# Patient Record
Sex: MALE | Race: WHITE | Employment: FULL TIME | URBAN - METROPOLITAN AREA
[De-identification: names, ages, dates, MRNs, and addresses within clinical notes are randomized per-mention and may not be internally consistent; named-entity substitution may affect disease eponyms.]

---

## 2018-10-13 ENCOUNTER — HOSPITAL ENCOUNTER (EMERGENCY)
Age: 23
Discharge: HOME | End: 2018-10-13
Payer: COMMERCIAL

## 2018-10-13 VITALS
DIASTOLIC BLOOD PRESSURE: 92 MMHG | HEART RATE: 63 BPM | BODY MASS INDEX: 32.72 KG/M2 | WEIGHT: 233.69 LBS | TEMPERATURE: 98 F | SYSTOLIC BLOOD PRESSURE: 166 MMHG | OXYGEN SATURATION: 97 % | RESPIRATION RATE: 16 BRPM | HEIGHT: 71 IN

## 2018-10-13 DIAGNOSIS — H18.891 CORNEAL RUST RING OF RIGHT EYE: ICD-10-CM

## 2018-10-13 DIAGNOSIS — S05.01XA ABRASION OF RIGHT CORNEA, INITIAL ENCOUNTER: ICD-10-CM

## 2018-10-13 DIAGNOSIS — T15.01XA FOREIGN BODY OF RIGHT CORNEA, INITIAL ENCOUNTER: Primary | ICD-10-CM

## 2018-10-13 PROCEDURE — 4500000023 HC ED LEVEL 3 PROCEDURE

## 2018-10-13 PROCEDURE — 99283 EMERGENCY DEPT VISIT LOW MDM: CPT

## 2018-10-13 PROCEDURE — 6370000000 HC RX 637 (ALT 250 FOR IP): Performed by: PHYSICIAN ASSISTANT

## 2018-10-13 RX ORDER — TETRACAINE HYDROCHLORIDE 5 MG/ML
2 SOLUTION OPHTHALMIC ONCE
Status: COMPLETED | OUTPATIENT
Start: 2018-10-13 | End: 2018-10-13

## 2018-10-13 RX ORDER — ERYTHROMYCIN 5 MG/G
OINTMENT OPHTHALMIC
Qty: 1 TUBE | Refills: 0 | Status: SHIPPED | OUTPATIENT
Start: 2018-10-13

## 2018-10-13 RX ADMIN — TETRACAINE HYDROCHLORIDE 2 DROP: 5 SOLUTION OPHTHALMIC at 20:11

## 2018-10-13 ASSESSMENT — ENCOUNTER SYMPTOMS
EYE PAIN: 1
PHOTOPHOBIA: 1
VOMITING: 0
BACK PAIN: 0
EYE DISCHARGE: 1
NAUSEA: 0
SHORTNESS OF BREATH: 0
ABDOMINAL PAIN: 0
SORE THROAT: 0

## 2018-10-13 ASSESSMENT — VISUAL ACUITY
OD: 20/20
OU: 20/15
OS: 20/15

## 2018-10-13 NOTE — ED TRIAGE NOTES
Pt states that he was working under a trailer and something dropped in his eye pt states that he tried to wash it out using water and eyedrops but was unsuccessful.  Pt is A and O x 4 and answering questions approprietly and without difficulty

## 2022-09-21 ENCOUNTER — APPOINTMENT (OUTPATIENT)
Dept: GENERAL RADIOLOGY | Age: 27
End: 2022-09-21
Payer: COMMERCIAL

## 2022-09-21 ENCOUNTER — HOSPITAL ENCOUNTER (EMERGENCY)
Age: 27
Discharge: HOME | End: 2022-09-21
Attending: EMERGENCY MEDICINE
Payer: COMMERCIAL

## 2022-09-21 VITALS
TEMPERATURE: 98.2 F | WEIGHT: 263.01 LBS | BODY MASS INDEX: 36.82 KG/M2 | HEART RATE: 107 BPM | OXYGEN SATURATION: 97 % | RESPIRATION RATE: 17 BRPM | SYSTOLIC BLOOD PRESSURE: 121 MMHG | DIASTOLIC BLOOD PRESSURE: 83 MMHG | HEIGHT: 71 IN

## 2022-09-21 DIAGNOSIS — U07.1 COVID-19: ICD-10-CM

## 2022-09-21 DIAGNOSIS — S29.012A MUSCLE STRAIN OF LEFT UPPER BACK, INITIAL ENCOUNTER: Primary | ICD-10-CM

## 2022-09-21 DIAGNOSIS — F41.1 ANXIETY STATE: ICD-10-CM

## 2022-09-21 LAB
A/G RATIO: 1.9 (ref 1.1–2.2)
ALBUMIN SERPL-MCNC: 4.8 G/DL (ref 3.4–5)
ALP BLD-CCNC: 115 U/L (ref 40–129)
ALT SERPL-CCNC: 33 U/L (ref 10–40)
ANION GAP SERPL CALCULATED.3IONS-SCNC: 12 MMOL/L (ref 3–16)
AST SERPL-CCNC: 22 U/L (ref 15–37)
BASOPHILS ABSOLUTE: 0.1 K/UL (ref 0–0.2)
BASOPHILS RELATIVE PERCENT: 1 %
BILIRUB SERPL-MCNC: <0.2 MG/DL (ref 0–1)
BILIRUBIN URINE: NEGATIVE
BLOOD, URINE: NEGATIVE
BUN BLDV-MCNC: 5 MG/DL (ref 7–20)
CALCIUM SERPL-MCNC: 9.2 MG/DL (ref 8.3–10.6)
CHLORIDE BLD-SCNC: 100 MMOL/L (ref 99–110)
CLARITY: CLEAR
CO2: 24 MMOL/L (ref 21–32)
COLOR: ABNORMAL
CREAT SERPL-MCNC: 0.9 MG/DL (ref 0.9–1.3)
D DIMER: <0.27 UG/ML FEU (ref 0–0.6)
EOSINOPHILS ABSOLUTE: 0.2 K/UL (ref 0–0.6)
EOSINOPHILS RELATIVE PERCENT: 1.9 %
GFR AFRICAN AMERICAN: >60
GFR NON-AFRICAN AMERICAN: >60
GLUCOSE BLD-MCNC: 107 MG/DL (ref 70–99)
GLUCOSE URINE: NEGATIVE MG/DL
HCT VFR BLD CALC: 40.5 % (ref 40.5–52.5)
HEMOGLOBIN: 14.1 G/DL (ref 13.5–17.5)
KETONES, URINE: ABNORMAL MG/DL
LACTIC ACID, SEPSIS: 0.8 MMOL/L (ref 0.4–1.9)
LACTIC ACID, SEPSIS: 2 MMOL/L (ref 0.4–1.9)
LEUKOCYTE ESTERASE, URINE: NEGATIVE
LYMPHOCYTES ABSOLUTE: 0.3 K/UL (ref 1–5.1)
LYMPHOCYTES RELATIVE PERCENT: 2.5 %
MAGNESIUM: 1.7 MG/DL (ref 1.8–2.4)
MCH RBC QN AUTO: 29.2 PG (ref 26–34)
MCHC RBC AUTO-ENTMCNC: 34.7 G/DL (ref 31–36)
MCV RBC AUTO: 84.1 FL (ref 80–100)
MICROSCOPIC EXAMINATION: ABNORMAL
MONOCYTES ABSOLUTE: 1.5 K/UL (ref 0–1.3)
MONOCYTES RELATIVE PERCENT: 15.3 %
NEUTROPHILS ABSOLUTE: 8.1 K/UL (ref 1.7–7.7)
NEUTROPHILS RELATIVE PERCENT: 79.3 %
NITRITE, URINE: NEGATIVE
PDW BLD-RTO: 13.5 % (ref 12.4–15.4)
PH UA: 5.5 (ref 5–8)
PLATELET # BLD: 231 K/UL (ref 135–450)
PMV BLD AUTO: 9.7 FL (ref 5–10.5)
POTASSIUM REFLEX MAGNESIUM: 3.5 MMOL/L (ref 3.5–5.1)
PROTEIN UA: NEGATIVE MG/DL
RAPID INFLUENZA  B AGN: NEGATIVE
RAPID INFLUENZA A AGN: NEGATIVE
RBC # BLD: 4.82 M/UL (ref 4.2–5.9)
SARS-COV-2, NAAT: DETECTED
SEDIMENTATION RATE, ERYTHROCYTE: 14 MM/HR (ref 0–15)
SODIUM BLD-SCNC: 136 MMOL/L (ref 136–145)
SPECIFIC GRAVITY UA: 1.02 (ref 1–1.03)
TOTAL PROTEIN: 7.3 G/DL (ref 6.4–8.2)
URINE REFLEX TO CULTURE: ABNORMAL
URINE TYPE: ABNORMAL
UROBILINOGEN, URINE: 1 E.U./DL
WBC # BLD: 10.2 K/UL (ref 4–11)

## 2022-09-21 PROCEDURE — 85379 FIBRIN DEGRADATION QUANT: CPT

## 2022-09-21 PROCEDURE — 96376 TX/PRO/DX INJ SAME DRUG ADON: CPT

## 2022-09-21 PROCEDURE — 85652 RBC SED RATE AUTOMATED: CPT

## 2022-09-21 PROCEDURE — 83735 ASSAY OF MAGNESIUM: CPT

## 2022-09-21 PROCEDURE — 96361 HYDRATE IV INFUSION ADD-ON: CPT

## 2022-09-21 PROCEDURE — 87635 SARS-COV-2 COVID-19 AMP PRB: CPT

## 2022-09-21 PROCEDURE — 71046 X-RAY EXAM CHEST 2 VIEWS: CPT

## 2022-09-21 PROCEDURE — 6370000000 HC RX 637 (ALT 250 FOR IP): Performed by: PHYSICIAN ASSISTANT

## 2022-09-21 PROCEDURE — 80053 COMPREHEN METABOLIC PANEL: CPT

## 2022-09-21 PROCEDURE — 99284 EMERGENCY DEPT VISIT MOD MDM: CPT

## 2022-09-21 PROCEDURE — 81003 URINALYSIS AUTO W/O SCOPE: CPT

## 2022-09-21 PROCEDURE — 2580000003 HC RX 258: Performed by: PHYSICIAN ASSISTANT

## 2022-09-21 PROCEDURE — 96375 TX/PRO/DX INJ NEW DRUG ADDON: CPT

## 2022-09-21 PROCEDURE — 87804 INFLUENZA ASSAY W/OPTIC: CPT

## 2022-09-21 PROCEDURE — 6360000002 HC RX W HCPCS: Performed by: PHYSICIAN ASSISTANT

## 2022-09-21 PROCEDURE — 96374 THER/PROPH/DIAG INJ IV PUSH: CPT

## 2022-09-21 PROCEDURE — 6360000002 HC RX W HCPCS: Performed by: EMERGENCY MEDICINE

## 2022-09-21 PROCEDURE — 87040 BLOOD CULTURE FOR BACTERIA: CPT

## 2022-09-21 PROCEDURE — 85025 COMPLETE CBC W/AUTO DIFF WBC: CPT

## 2022-09-21 PROCEDURE — 36415 COLL VENOUS BLD VENIPUNCTURE: CPT

## 2022-09-21 PROCEDURE — 83605 ASSAY OF LACTIC ACID: CPT

## 2022-09-21 RX ORDER — ACETAMINOPHEN 500 MG
1000 TABLET ORAL ONCE
Status: COMPLETED | OUTPATIENT
Start: 2022-09-21 | End: 2022-09-21

## 2022-09-21 RX ORDER — 0.9 % SODIUM CHLORIDE 0.9 %
30 INTRAVENOUS SOLUTION INTRAVENOUS ONCE
Status: COMPLETED | OUTPATIENT
Start: 2022-09-21 | End: 2022-09-21

## 2022-09-21 RX ORDER — ORPHENADRINE CITRATE 30 MG/ML
60 INJECTION INTRAMUSCULAR; INTRAVENOUS ONCE
Status: COMPLETED | OUTPATIENT
Start: 2022-09-21 | End: 2022-09-21

## 2022-09-21 RX ORDER — KETOROLAC TROMETHAMINE 30 MG/ML
15 INJECTION, SOLUTION INTRAMUSCULAR; INTRAVENOUS ONCE
Status: COMPLETED | OUTPATIENT
Start: 2022-09-21 | End: 2022-09-21

## 2022-09-21 RX ORDER — ACETAMINOPHEN 500 MG
500 TABLET ORAL 4 TIMES DAILY PRN
Qty: 40 TABLET | Refills: 0 | Status: SHIPPED | OUTPATIENT
Start: 2022-09-21

## 2022-09-21 RX ORDER — KETOROLAC TROMETHAMINE 10 MG/1
10 TABLET, FILM COATED ORAL 3 TIMES DAILY
Qty: 15 TABLET | Refills: 0 | Status: SHIPPED | OUTPATIENT
Start: 2022-09-21 | End: 2022-09-26

## 2022-09-21 RX ADMIN — KETOROLAC TROMETHAMINE 15 MG: 30 INJECTION, SOLUTION INTRAMUSCULAR at 08:54

## 2022-09-21 RX ADMIN — SODIUM CHLORIDE 2259 ML: 9 INJECTION, SOLUTION INTRAVENOUS at 09:15

## 2022-09-21 RX ADMIN — KETOROLAC TROMETHAMINE 15 MG: 30 INJECTION, SOLUTION INTRAMUSCULAR at 12:39

## 2022-09-21 RX ADMIN — ORPHENADRINE CITRATE 60 MG: 30 INJECTION INTRAMUSCULAR; INTRAVENOUS at 12:39

## 2022-09-21 RX ADMIN — ACETAMINOPHEN 1000 MG: 500 TABLET ORAL at 10:56

## 2022-09-21 ASSESSMENT — ENCOUNTER SYMPTOMS
SORE THROAT: 0
NAUSEA: 0
VOMITING: 0
SHORTNESS OF BREATH: 0
ABDOMINAL PAIN: 0
BACK PAIN: 1

## 2022-09-21 ASSESSMENT — PAIN SCALES - GENERAL
PAINLEVEL_OUTOF10: 6
PAINLEVEL_OUTOF10: 6
PAINLEVEL_OUTOF10: 8
PAINLEVEL_OUTOF10: 6
PAINLEVEL_OUTOF10: 6

## 2022-09-21 ASSESSMENT — PAIN DESCRIPTION - DESCRIPTORS: DESCRIPTORS: PATIENT UNABLE TO DESCRIBE

## 2022-09-21 ASSESSMENT — PAIN DESCRIPTION - LOCATION: LOCATION: SHOULDER

## 2022-09-21 ASSESSMENT — PAIN DESCRIPTION - ORIENTATION: ORIENTATION: LEFT

## 2022-09-21 NOTE — ED NOTES

## 2022-09-21 NOTE — ED PROVIDER NOTES
I independently performed a history and physical on [address]. All diagnostic, treatment, and disposition decisions were made by myself in conjunction with the advanced practice provider. For further details of [facility] emergency department encounter, please see Divine Christiansen PA-C's documentation. Patient complains of a left-sided neck and upper back pain that came on Monday while lifting a heavy object. He states he went to urgent care and was given some muscle relaxers which have given some slight improvement. He still has an 8 out of 10 pain that is aching and sharp. He also arrives with a fever. He denies any other complaints. He was not even aware he had a fever before he got here. No vomiting or diarrhea. No chest pain or shortness of breath. No rhinorrhea or cough or sore throat. Patient reports that he does have severe anxiety around hospitals and doctors and does not actually want to be here. On exam heart tachycardic and regular and lungs clear to auscultation bilaterally and abdomen benign. Left side of the neck is tender posteriorly in the paraspinous muscles and the trapezius.     Labs Reviewed   COVID-19, RAPID - Abnormal; Notable for the following components:       Result Value    SARS-CoV-2, NAAT DETECTED (*)     All other components within normal limits   CBC WITH AUTO DIFFERENTIAL - Abnormal; Notable for the following components:    Neutrophils Absolute 8.1 (*)     Lymphocytes Absolute 0.3 (*)     Monocytes Absolute 1.5 (*)     All other components within normal limits   COMPREHENSIVE METABOLIC PANEL W/ REFLEX TO MG FOR LOW K - Abnormal; Notable for the following components:    Glucose 107 (*)     BUN 5 (*)     All other components within normal limits   URINALYSIS WITH REFLEX TO CULTURE - Abnormal; Notable for the following components:    Color, UA DARK YELLOW (*)     Ketones, Urine TRACE (*)     All other components within normal limits   LACTATE, SEPSIS - Abnormal; Notable for the following components:    Lactic Acid, Sepsis 2.0 (*)     All other components within normal limits   MAGNESIUM - Abnormal; Notable for the following components:    Magnesium 1.70 (*)     All other components within normal limits   RAPID INFLUENZA A/B ANTIGENS   CULTURE, BLOOD 1   CULTURE, BLOOD 2   LACTATE, SEPSIS   SEDIMENTATION RATE   D-DIMER, QUANTITATIVE     XR CHEST (2 VW)   Final Result   Negative           I personally saw this patient and performed a substantive portion of the visit including all aspects of the medical decision making. MDM  Aside from having COVID the patient has no other PE risk factors and a negative D-dimer. I feel that his tachycardia is not due to any COVID complication or PE but rather due to his significant anxiety. I advised him to stay well-hydrated and return for any chest pain or shortness of breath    I personally saw this patient and independently provided 10 minutes of non-concurrent critical care out of the total shared critical care time provided.         Marylou Trevino MD  09/21/22 2893

## 2022-09-21 NOTE — ED NOTES
Discharge and education instructions reviewed. Patient verbalized understanding, teach-back successful. Patient denied questions at this time. No acute distress noted. Patient instructed to follow-up as noted - return to emergency department if symptoms worsen. Patient verbalized understanding. Discharged per EDMD with discharge instructions.           Jeff Broussard RN  09/21/22 2730

## 2022-09-21 NOTE — DISCHARGE INSTR - COC
Continuity of Care Form    Patient Name: Bindu Carrillo   :  1995  MRN:  [id number]    Admit date:  2022  Discharge date:  ***    Code Status Order: No Order   Advance Directives:     Admitting Physician:  No admitting provider for patient encounter. PCP: Lashawn Silverman MD    Discharging Nurse: [facility] Unit/Room#: 038/D-38  Discharging Unit Phone Number: ***    Emergency Contact:   Extended Emergency Contact Information  Primary Emergency Contact: Cheikh Corea  Address: [address] [facility], [address] [place] [address] Phone: [phone]  Work Phone: [phone]  Relation: Parent  Secondary Emergency Contact: Jess Pina  Address: [address] [place] [address], [address] [place] [address],[address] Phone: [phone]  Work Phone: [phone]  Relation: Spouse    Past Surgical History:  History reviewed. No pertinent surgical history. Immunization History: There is no immunization history for the selected administration types on file for this patient. Active Problems: There is no problem list on file for this patient.       Isolation/Infection:   Isolation            No Isolation          Patient Infection Status       Infection Onset Added Last Indicated Last Indicated By Review Planned Expiration Resolved Resolved By    COVID-19 (Rule Out) 22 COVID-19, Rapid (Ordered) 10/01/22 10/05/22      Resolved    COVID-19 (Rule Out) 22 COVID-19 & Influenza Combo (Ordered)   22 Rule-Out Test Canceled            Nurse Assessment:  Last Vital Signs: BP (!) 142/83   Pulse (!) 130   Temp (!) 102.3 °F (39.1 °C) (Oral)   Resp 24   Ht 5' 11\" (1.803 m)   Wt 263 lb 0.1 oz (119.3 kg)   SpO2 100%   BMI 36.68 kg/m²     Last documented pain score (0-10 scale): Pain Level: 8  Last Weight:   Wt Readings from Last 1 Encounters:   22 263 lb 0.1 oz (119.3 kg)     Mental Status: {IP PT MENTAL STATUS:}    IV Access:  { BENJY IV ACCESS:226252745}    Nursing Mobility/ADLs:  Walking   {P DME ELZA}  Transfer  {P DME OREY:967321233}  Bathing  {CHP DME TMEI:050078553}  Dressing  {CHP DME MZVR:827435924}  Toileting  {CHP DME DWYK:866934595}  Feeding  {[facility] DME AJAF:465400674}  Med Admin  {P DME WJYA:371936766}  Med Delivery   { BENJY MED Delivery:568110106}    Wound Care Documentation and Therapy:        Elimination:  Continence: Bowel: {YES / NL:89759}  Bladder: {YES / F}  Urinary Catheter: {Urinary Catheter:551621657}   Colostomy/Ileostomy/Ileal Conduit: {YES / FN:97363}       Date of Last BM: ***  No intake or output data in the 24 hours ending 22 0914  No intake/output data recorded.     Safety Concerns:     [employer] Safety Concerns:916549324}    Impairments/Disabilities:      [employer] Impairments/Disabilities:044157923}    Nutrition Therapy:  Current Nutrition Therapy:   [employer] Diet List:809707779}    Routes of Feeding: {[facility] DME Other Feedings:514388010}  Liquids: {Slp liquid thickness:96456}  Daily Fluid Restriction: {P DME Yes amt example:382991368}  Last Modified Barium Swallow with Video (Video Swallowing Test): {Done Not Done MXCD:954142857}    Treatments at the Time of Hospital Discharge:   Respiratory Treatments: ***  Oxygen Therapy:  {Therapy; copd oxygen:94276}  Ventilator:    {[facility] Vent SDTO:859205932}    Rehab Therapies: {THERAPEUTIC INTERVENTION:7996716645}  Weight Bearing Status/Restrictions: [employer] Weight Bearin}  Other Medical Equipment (for information only, NOT a DME order):  {EQUIPMENT:349954850}  Other Treatments: ***    Patient's personal belongings (please select all that are sent with patient):  {[facility] DME Belongings:504611880}    RN SIGNATURE:  {Esignature:106447784}    CASE MANAGEMENT/SOCIAL WORK SECTION    Inpatient Status Date: ***    Readmission Risk Assessment Score:  Readmission Risk              Risk of Unplanned Readmission:  0           Discharging to Facility/ Agency   Name:   Address:  Phone:  Fax:    Dialysis Facility (if applicable)   Name:  Address:  Dialysis Schedule:  Phone:  Fax:    / signature: {Esignature:341613707}    PHYSICIAN SECTION    Prognosis: {Prognosis:4196782297}    Condition at Discharge: Satya8 Ciara Louie Patient Condition:095959868}    Rehab Potential (if transferring to Rehab): {Prognosis:7075194573}    Recommended Labs or Other Treatments After Discharge: ***    Physician Certification: I certify the above information and transfer of Mayda Arriaga  is necessary for the continuing treatment of the diagnosis listed and that he requires {Admit to Appropriate Level of Care:59953} for {GREATER/LESS:860783748} 30 days.      Update Admission H&P: {CHP DME Changes in AIXWO:876652846}    PHYSICIAN SIGNATURE:  {Esignature:547775057}

## 2022-09-21 NOTE — ED NOTES
Notified by lab that they are running QC on machines and lactic result will be delayed.       Martie Kawasaki, RN  09/21/22 0583

## 2022-09-21 NOTE — Clinical Note
May Kamara was seen and treated in our emergency department on 9/21/2022. COVID19 virus is suspected. Per the CDC guidelines we recommend home isolation until the following conditions are all met:    1. At least five days have passed since symptoms first appeared and/or had a close exposure,   2. After home isolation for five days, wearing a mask around others for the next five days,  3. At least 24 have passed since last fever without the use of fever-reducing medications and  4. Symptoms (eg cough, shortness of breath) have improved    If you have any questions or concerns, please don't hesitate to call. He may return to work/school on 09/26/2022    May return once fever free for 24 hours after 9/25 and mask for the first 5 days.      Javan Sheets MD

## 2022-09-21 NOTE — ED PROVIDER NOTES
[address] [facility]      Pt Name: Mindy Mack  MRN: [id number]  [place] 1995  Date of evaluation: 9/21/2022  Provider: Paola Chawla PA-C    This patient was seen and evaluated by the attending physician Bernarda Daugherty MD.    CHIEF COMPLAINT      Chief Complaint: back pain; fever      CRITICAL CARE TIME   Total Critical Care time was 35 minutes, excluding separately reportable procedures. There was a high probability of clinically significant/life threatening deterioration in the patient's condition which required my urgent intervention. HISTORYOF PRESENT ILLNESS  (Location/Symptom, Timing/Onset, Context/Setting, Quality, Duration, Modifying Factors, Severity.)   Mindy Mack is a 32 y.o. male who presents to the emergency department complaining of upper back pain. Pain started while working on Monday. He says he went to lift something off of a cart that was about waist high when he developed pain in his left upper back. It has been constant since onset. It worsens with movements. He went to urgent care and was prescribed Flexeril which she has been taking with some improvement. He rates the pain 8 out of 10 describes as aching and sharp. He reports no associated loss of control of bowel or bladder, numbness or weakness of extremities, saddle anesthesia. No history of IV drug abuse. The patient denied known fever however was found to be febrile with a temp of 102.3 on arrival.  He says he did not realize he had a fever and he denies any other symptoms such as cough, sore throat, headache, body aches, chest pain or shortness of breath. Nursing Notes were reviewed and I agree. REVIEW OF SYSTEMS    (2-9 systems for level 4, 10 or more forlevel 5)     Review of Systems   Constitutional:  Negative for chills and fever. HENT:  Negative for sore throat. Respiratory:  Negative for shortness of breath. Cardiovascular:  Negative for chest pain. Gastrointestinal:  Negative for abdominal pain, nausea and vomiting. Genitourinary:  Negative for difficulty urinating and dysuria. Musculoskeletal:  Positive for back pain. Skin:  Negative for rash. Neurological:  Negative for weakness, light-headedness, numbness and headaches. Psychiatric/Behavioral:  The patient is not nervous/anxious. All other systems reviewed and are negative. Positives and Pertinent negatives as per HPI. Except as noted above the remainder of the review of systems was reviewed and negative. PAST MEDICALHISTORY   History reviewed. No pertinent past medical history. SURGICAL HISTORY     History reviewed. No pertinent surgical history. CURRENT MEDICATIONS       Previous Medications    ERYTHROMYCIN (ROMYCIN) 5 MG/GM OPHTHALMIC OINTMENT    1/2 inch to the conjunctival sac 4 times daily for 5 days       ALLERGIES     Patient has no known allergies. FAMILY HISTORY     History reviewed. No pertinent family history. No family status information on file. SOCIAL HISTORY    reports that he has quit smoking. His smoking use included cigarettes. He smoked an average of .5 packs per day. He has never used smokeless tobacco. He reports that he does not drink alcohol and does not use drugs. PHYSICAL EXAM    (up to 7 for level 4, 8 or more for level 5)     ED Triage Vitals   BP Temp Temp Source Heart Rate Resp SpO2 Height Weight   09/21/22 0826 09/21/22 0826 09/21/22 0826 09/21/22 0826 09/21/22 0826 09/21/22 0826 09/21/22 0837 09/21/22 0826   (!) 142/83 (!) 102.3 °F (39.1 °C) Oral (!) 130 24 100 % 5' 11\" (1.803 m) 263 lb 0.1 oz (119.3 kg)       Physical Exam  Vitals and nursing note reviewed. Constitutional:       General: He is not in acute distress. Appearance: He is well-developed. HENT:      Head: Normocephalic and atraumatic.       Mouth/Throat:      Mouth: Mucous membranes are moist.      Pharynx: No oropharyngeal exudate or posterior oropharyngeal erythema. Cardiovascular:      Rate and Rhythm: Normal rate and regular rhythm. Heart sounds: Normal heart sounds. Pulmonary:      Effort: Pulmonary effort is normal. No respiratory distress. Breath sounds: Normal breath sounds. No rhonchi. Abdominal:      Palpations: Abdomen is soft. Tenderness: There is no abdominal tenderness. Musculoskeletal:         General: Tenderness (left upper back between spine and scapula, there is some minimal C7 midline tenderness, no erythema/edema/crepitance/ecchymosis/erythema) present. Normal range of motion. Cervical back: Neck supple. Skin:     General: Skin is warm and dry. Neurological:      General: No focal deficit present. Mental Status: He is alert and oriented to person, place, and time. Sensory: No sensory deficit. Motor: No weakness.    Psychiatric:         Behavior: Behavior normal.          DIAGNOSTIC RESULTS     When ordered, EKGs are interpreted by the Emergency Department Physician in the absence of a cardiologist. Please see their note for interpretation of EKG    RADIOLOGY:         Interpretation per the Radiologist below, if available at the time of this note:    XR CHEST (2 VW)   Final Result   Negative             LABS:  Labs Reviewed   COVID-19, RAPID - Abnormal; Notable for the following components:       Result Value    SARS-CoV-2, NAAT DETECTED (*)     All other components within normal limits   CBC WITH AUTO DIFFERENTIAL - Abnormal; Notable for the following components:    Neutrophils Absolute 8.1 (*)     Lymphocytes Absolute 0.3 (*)     Monocytes Absolute 1.5 (*)     All other components within normal limits   COMPREHENSIVE METABOLIC PANEL W/ REFLEX TO MG FOR LOW K - Abnormal; Notable for the following components:    Glucose 107 (*)     BUN 5 (*)     All other components within normal limits   URINALYSIS WITH REFLEX TO CULTURE - Abnormal; Notable for the following components:    Color, UA DARK YELLOW (*)     Ketones, Urine TRACE (*)     All other components within normal limits   LACTATE, SEPSIS - Abnormal; Notable for the following components:    Lactic Acid, Sepsis 2.0 (*)     All other components within normal limits   MAGNESIUM - Abnormal; Notable for the following components:    Magnesium 1.70 (*)     All other components within normal limits   RAPID INFLUENZA A/B ANTIGENS   CULTURE, BLOOD 1   CULTURE, BLOOD 2   SEDIMENTATION RATE   D-DIMER, QUANTITATIVE   LACTATE, SEPSIS       When ordered, only abnormal lab results are displayed. All other labs are within normal range or not returned as of the dictation. EMERGENCY DEPARTMENT COURSE and DIFFERENTIAL DIAGNOSIS/MDM:   Vitals:    Vitals:    09/21/22 1115 09/21/22 1130 09/21/22 1215 09/21/22 1230   BP: 122/68 125/63 132/61 (!) 147/86   Pulse: (!) 123 (!) 117 (!) 120 (!) 107   Resp: 25 22 19 24   Temp:       TempSrc:       SpO2: 100% 100% 98% 97%   Weight:       Height:           I saw this patient with Dr. Mannie Joseph who spent face-to-face time with the patient and agreed with my assessment and plan. The patient was febrile and tachycardic on arrival.  IV was established and septic work-up performed. He was given fluid bolus per sepsis protocol based on ideal body weight secondary to obesity. He was given Toradol, Tylenol and Norflex and on reassessment he was feeling better. Rapid COVID was positive. I think this explains his fever. A sed rate was normal.  The patient has normal white blood cell count. H&H are stable. Electrolytes normal.  Renal function normal.  Glucose 107. Urine without evidence for infection. Rapid flu is negative. Chest x-ray clear. Despite fluids and antipyretics, he remains tachycardic. His heart rate is improving. When he was talking with Dr. Mannie Joseph his heart rate shot up into the 130s again.   He admitted to having a lot of anxiety around doctors and in the hospital.  When he admitted this his heart rate came back down. Is now in the low 100s. I do think this is more consistent with anxiety state but a D-dimer was obtained because of the patient's persistent tachycardia and the pain in his left upper back but the D-dimer is negative. I think this sufficiently rules out PE in this low risk patient. We will treat for muscle strain of the upper back with Toradol and Tylenol. He was given a work excuse because of COVID he was given 5 days off work followed by a 5-day masking. Is this patient to be included in the SEP-1 Core Measure due to severe sepsis or septic shock? No   Exclusion criteria - the patient is NOT to be included for SEP-1 Core Measure due to:  Viral etiology found or highly suspected (including COVID-19) without concomitant bacterial infection      Discussed results, diagnosis and plan with patient and/or family. Questions addressed. Dispositionand follow-up agreed upon. Specific discharge instructions explained. The patient and/or family and I have discussed the diagnosis and risks, and we agree with discharging home to follow-up with their primary care,specialist or referral doctor. We also discussed returning to the Emergency Department immediately if new or worsening symptoms occur. We have discussed the symptoms which are most concerning that necessitate immediatereturn. PROCEDURES:  None    FINAL IMPRESSION      1. Muscle strain of left upper back, initial encounter    2. COVID-19    3.  Anxiety state          DISPOSITION/PLAN   DISPOSITION Decision To Discharge 09/21/2022 01:54:42 PM      PATIENT REFERRED TO:  Krys Weaver MD  [address] [place] [address],[address] [place] [address]  [phone]    Schedule an appointment as soon as possible for a visit       MEDICATIONS:  New Prescriptions    ACETAMINOPHEN (TYLENOL) 500 MG TABLET    Take 1 tablet by mouth 4 times daily as needed for Pain    KETOROLAC (TORADOL) 10 MG TABLET    Take 1 tablet by mouth three times daily for 5 days       (Please note that portions of this note were completed with a voice recognition program.  Efforts were made toedit the dictations but occasionally words are mis-transcribed.)    JASSI Harris PA-C  09/21/22 3699

## 2022-09-25 LAB
BLOOD CULTURE, ROUTINE: NORMAL
CULTURE, BLOOD 2: NORMAL

## 2024-09-28 ENCOUNTER — OFFICE VISIT (OUTPATIENT)
Age: 29
End: 2024-09-28

## 2024-09-28 VITALS
HEART RATE: 99 BPM | OXYGEN SATURATION: 95 % | HEIGHT: 71 IN | DIASTOLIC BLOOD PRESSURE: 85 MMHG | TEMPERATURE: 98.6 F | SYSTOLIC BLOOD PRESSURE: 145 MMHG | WEIGHT: 283 LBS | BODY MASS INDEX: 39.62 KG/M2

## 2024-09-28 DIAGNOSIS — R00.9 ELEVATED HEART RATE WITH ELEVATED BLOOD PRESSURE WITHOUT DIAGNOSIS OF HYPERTENSION: ICD-10-CM

## 2024-09-28 DIAGNOSIS — J02.9 SORE THROAT: Primary | ICD-10-CM

## 2024-09-28 DIAGNOSIS — J06.9 VIRAL UPPER RESPIRATORY TRACT INFECTION: ICD-10-CM

## 2024-09-28 DIAGNOSIS — R03.0 ELEVATED HEART RATE WITH ELEVATED BLOOD PRESSURE WITHOUT DIAGNOSIS OF HYPERTENSION: ICD-10-CM

## 2024-09-28 LAB
INFLUENZA A ANTIGEN, POC: NORMAL
INFLUENZA B ANTIGEN, POC: NORMAL
Lab: NORMAL
PERFORMING INSTRUMENT: NORMAL
QC PASS/FAIL: NORMAL
SARS-COV-2, POC: NORMAL
STREPTOCOCCUS A RNA: NEGATIVE

## 2024-09-28 RX ORDER — DEXTROMETHORPHAN HYDROBROMIDE AND PROMETHAZINE HYDROCHLORIDE 15; 6.25 MG/5ML; MG/5ML
5 SYRUP ORAL 4 TIMES DAILY PRN
Qty: 118 ML | Refills: 0 | Status: SHIPPED | OUTPATIENT
Start: 2024-09-28 | End: 2024-10-05

## 2024-09-28 ASSESSMENT — ENCOUNTER SYMPTOMS
VOMITING: 0
DIARRHEA: 0
SORE THROAT: 1
TROUBLE SWALLOWING: 0
ABDOMINAL PAIN: 0
RHINORRHEA: 1
COUGH: 1
SHORTNESS OF BREATH: 0
NAUSEA: 0

## 2025-02-03 ENCOUNTER — OFFICE VISIT (OUTPATIENT)
Age: 30
End: 2025-02-03

## 2025-02-03 VITALS
WEIGHT: 287.8 LBS | DIASTOLIC BLOOD PRESSURE: 92 MMHG | HEIGHT: 71 IN | SYSTOLIC BLOOD PRESSURE: 131 MMHG | HEART RATE: 108 BPM | TEMPERATURE: 98.3 F | OXYGEN SATURATION: 95 % | BODY MASS INDEX: 40.29 KG/M2

## 2025-02-03 DIAGNOSIS — J06.9 UPPER RESPIRATORY TRACT INFECTION, UNSPECIFIED TYPE: ICD-10-CM

## 2025-02-03 DIAGNOSIS — J06.9 VIRAL URI: Primary | ICD-10-CM

## 2025-02-03 PROBLEM — F10.10 ALCOHOL ABUSE: Status: ACTIVE | Noted: 2025-02-03

## 2025-02-03 PROBLEM — F51.3 SLEEP WALKING DISORDER: Status: ACTIVE | Noted: 2025-02-03

## 2025-02-03 PROBLEM — M25.519 ARTHRALGIA OF SHOULDER: Status: ACTIVE | Noted: 2025-02-03

## 2025-02-03 PROBLEM — M67.449 GANGLION OF HAND: Status: ACTIVE | Noted: 2025-02-03

## 2025-02-03 PROBLEM — F48.9 DEFERRED DIAGNOSIS ON AXIS II: Status: ACTIVE | Noted: 2025-02-03

## 2025-02-03 PROBLEM — F43.21 ADJUSTMENT DISORDER WITH DEPRESSED MOOD: Status: ACTIVE | Noted: 2025-02-03

## 2025-02-03 NOTE — PROGRESS NOTES
[facility]    Boston Pina (:  1995 MRN: [id number]) is a 30 y.o. male, here for evaluation of the following chief complaint(s):  Nasal Congestion, Generalized Body Aches, and Cough (Pt here for runny nose, body ache, stomach cramp and slight cough with scratchy throat since last night. )    ASSESSMENT/PLAN:    ICD-10-CM    1. Viral URI  J06.9       2. Upper respiratory tract infection, unspecified type  J06.9 POCT Influenza A/B        New Prescriptions    No medications on file     Disposition  - The patient's exam findings and complaint suggest that the disease process is viral in nature as opposed to a bacterial etiology. Therefore an antibiotic is not necessary at this time.  The patient understands that if symptoms get worse or fail to resolve 7 days out from the first day of symptoms, that they should return and be reevaluated and antibiotic therapy will be reconsidered.  Supportive therapy is indicated at this time.  This includes rest, fluids, and OTC methods to manage symptoms.  - The predominant reason for his visit today was to get a note for missing work. He also added that he probably wouldn't have come in had his wife not asked him too.  - Follow up discussed and will be on an as-needed basis.  - I explained the warning signs of when to go to the emergency room.  Differentials include: Bronchitis, GERD, asthma, COVID-19, Influenza, Pneumonia, RSV, Viral Upper Respiratory Infection,    SUBJECTIVE/OBJECTIVE:  HPI      He reportedly has been around someone who was recently ill, his wife who is also being seen today for similar symptoms. Furthermore, he missed work last night and needs a note.      Vital Signs  Vitals:    25 0944   BP: (!) 131/92   Pulse: (!) 108   Temp: 98.3 °F (36.8 °C)   TempSrc: Oral   SpO2: 95%   Weight: 130.5 kg (287 lb 12.8 oz)   Height: 1.803 m (5' 11\")     No results found for this visit on 25.     No orders to display     PHYSICAL EXAM  Physical